# Patient Record
Sex: FEMALE | Race: OTHER | HISPANIC OR LATINO
[De-identification: names, ages, dates, MRNs, and addresses within clinical notes are randomized per-mention and may not be internally consistent; named-entity substitution may affect disease eponyms.]

---

## 2018-09-07 PROBLEM — Z00.00 ENCOUNTER FOR PREVENTIVE HEALTH EXAMINATION: Status: ACTIVE | Noted: 2018-09-07

## 2018-09-17 ENCOUNTER — APPOINTMENT (OUTPATIENT)
Dept: ORTHOPEDIC SURGERY | Facility: CLINIC | Age: 58
End: 2018-09-17
Payer: COMMERCIAL

## 2018-09-17 VITALS — HEIGHT: 60 IN | BODY MASS INDEX: 25.91 KG/M2 | WEIGHT: 132 LBS

## 2018-09-17 DIAGNOSIS — M76.52 PATELLAR TENDINITIS, LEFT KNEE: ICD-10-CM

## 2018-09-17 DIAGNOSIS — M22.42 CHONDROMALACIA PATELLAE, LEFT KNEE: ICD-10-CM

## 2018-09-17 DIAGNOSIS — S83.282A OTHER TEAR OF LATERAL MENISCUS, CURRENT INJURY, LEFT KNEE, INITIAL ENCOUNTER: ICD-10-CM

## 2018-09-17 DIAGNOSIS — M25.562 PAIN IN LEFT KNEE: ICD-10-CM

## 2018-09-17 PROCEDURE — 73564 X-RAY EXAM KNEE 4 OR MORE: CPT | Mod: LT

## 2018-09-17 PROCEDURE — 73560 X-RAY EXAM OF KNEE 1 OR 2: CPT | Mod: RT

## 2018-09-17 PROCEDURE — 99203 OFFICE O/P NEW LOW 30 MIN: CPT

## 2020-04-26 ENCOUNTER — MESSAGE (OUTPATIENT)
Age: 60
End: 2020-04-26

## 2020-05-08 LAB
SARS-COV-2 IGG SERPL IA-ACNC: <0.1 INDEX
SARS-COV-2 IGG SERPL QL IA: NEGATIVE

## 2022-04-28 ENCOUNTER — EMERGENCY (EMERGENCY)
Facility: HOSPITAL | Age: 62
LOS: 1 days | Discharge: HOME | End: 2022-04-28
Admitting: STUDENT IN AN ORGANIZED HEALTH CARE EDUCATION/TRAINING PROGRAM
Payer: COMMERCIAL

## 2022-04-28 VITALS
TEMPERATURE: 99 F | OXYGEN SATURATION: 98 % | DIASTOLIC BLOOD PRESSURE: 70 MMHG | WEIGHT: 134.92 LBS | HEART RATE: 62 BPM | RESPIRATION RATE: 18 BRPM | SYSTOLIC BLOOD PRESSURE: 130 MMHG
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
## 2022-04-28 DIAGNOSIS — S61.411A LACERATION WITHOUT FOREIGN BODY OF RIGHT HAND, INITIAL ENCOUNTER: ICD-10-CM
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
## 2022-04-28 DIAGNOSIS — Z23 ENCOUNTER FOR IMMUNIZATION: ICD-10-CM
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
## 2022-04-28 PROCEDURE — 12001 RPR S/N/AX/GEN/TRNK 2.5CM/<: CPT

## 2022-04-28 PROCEDURE — 90471 IMMUNIZATION ADMIN: CPT

## 2022-04-28 PROCEDURE — 90715 TDAP VACCINE 7 YRS/> IM: CPT

## 2022-04-28 PROCEDURE — 99283 EMERGENCY DEPT VISIT LOW MDM: CPT | Mod: 25

## 2022-04-28 RX ORDER — TETANUS TOXOID, REDUCED DIPHTHERIA TOXOID AND ACELLULAR PERTUSSIS VACCINE, ADSORBED 5; 2.5; 8; 8; 2.5 [IU]/.5ML; [IU]/.5ML; UG/.5ML; UG/.5ML; UG/.5ML
0.5 SUSPENSION INTRAMUSCULAR ONCE
Refills: 0 | Status: COMPLETED | OUTPATIENT
Start: 2022-04-28 | End: 2022-04-28

## 2022-04-28 RX ADMIN — TETANUS TOXOID, REDUCED DIPHTHERIA TOXOID AND ACELLULAR PERTUSSIS VACCINE, ADSORBED 0.5 MILLILITER(S): 5; 2.5; 8; 8; 2.5 SUSPENSION INTRAMUSCULAR at 11:47

## 2022-04-28 NOTE — ED PROVIDER NOTE - SKIN, MLM
2 cm lac to palm of right hand on ulnar aspect, +FROM of 5th digit, strength 5/5, sensation intact distally

## 2022-04-28 NOTE — ED PROVIDER NOTE - PATIENT PORTAL LINK FT
You can access the FollowMyHealth Patient Portal offered by [facility] by registering at the following website: [email or web address]. By joining [email or web address]’s FollowMyHealth portal, you will also be able to view your health information using other applications (apps) compatible with our system.

## 2022-04-28 NOTE — ED PROCEDURE NOTE - CPROC ED TOLERANCE1
Patient tolerated procedure well.
I will STOP taking the medications listed below when I get home from the hospital:  None

## 2022-04-28 NOTE — ED ADULT NURSE NOTE - OBJECTIVE STATEMENT
Pt AOX4. Pt presents with laceration to R palm. Pt states "I was washing dishes and I accidently cut my palm with a knife". No active bleeding. Denies blood thinner use. Denies numbness, tingling. Pt speaking in full complete sentences. Respirations even and unlabored. Pt speaking in full complete sentences. Respirations even and unlabored.

## 2022-04-28 NOTE — ED ADULT TRIAGE NOTE - CHIEF COMPLAINT QUOTE
pt with a sustained laceration to the right palm. stated " I was washing the dishes and cut myself with the knife" no active bleeding. denies use of blood thinners. unk last tetanus,

## 2022-04-28 NOTE — ED ADULT NURSE NOTE - NSIMPLEMENTINTERV_GEN_ALL_ED
Implemented All Universal Safety Interventions:  [place] to call system. Call bell, personal items and telephone within reach. Instruct patient to call for assistance. Room bathroom lighting operational. Non-slip footwear when patient is off stretcher. Physically safe environment: no spills, clutter or unnecessary equipment. Stretcher in lowest position, wheels locked, appropriate side rails in place.

## 2022-04-28 NOTE — ED PROVIDER NOTE - CLINICAL SUMMARY MEDICAL DECISION MAKING FREE TEXT BOX
60 y/o f present with lac to palm of right hand; tetanus updated in ED, ext NVI, lac repaired.  Will d/c with wound care instructions, f/u in 7-10 days for suture removal.

## 2022-04-28 NOTE — ED PROVIDER NOTE - OBJECTIVE STATEMENT
62 y/o f presents with lac to right palm.  Pt stating she was she was cleaning dishes and accidentally cut with a knife.  Pt unsure of last tetanus.  Denies numbness/tingling to ext, all other ROS negative.

## 2022-04-28 NOTE — ED PROVIDER NOTE - NSFOLLOWUPINSTRUCTIONS_ED_ALL_ED_FT
Please return to ED in 7-10 days for suture removal.      Laceration    WHAT YOU NEED TO KNOW:    A laceration is an injury to the skin and the soft tissue underneath it. Lacerations can happen anywhere on the body.    DISCHARGE INSTRUCTIONS:    Return to the emergency department if:   •You have heavy bleeding or bleeding that does not stop after 10 minutes of holding firm, direct pressure over the wound.      •Your wound opens up.      Call your doctor if:   •You have a fever or chills.      •Your laceration is red, warm, or swollen.      •You have red streaks on your skin coming from your wound.      •You have white or yellow drainage from the wound that smells bad.      •You have pain that gets worse, even after treatment.      •You have questions or concerns about your condition or care.      Medicines: You may need any of the following:   •Prescription pain medicine may be given. Ask your healthcare provider how to take this medicine safely. Some prescription pain medicines contain acetaminophen. Do not take other medicines that contain acetaminophen without talking to your healthcare provider. Too much acetaminophen may cause liver damage. Prescription pain medicine may cause constipation. Ask your healthcare provider how to prevent or treat constipation.       •Antibiotics help treat or prevent a bacterial infection.      •Take your medicine as directed. Contact your healthcare provider if you think your medicine is not helping or if you have side effects. Tell him or her if you are allergic to any medicine. Keep a list of the medicines, vitamins, and herbs you take. Include the amounts, and when and why you take them. Bring the list or the pill bottles to follow-up visits. Carry your medicine list with you in case of an emergency.      Care for your wound as directed:   •Do not get your wound wet until your healthcare provider says it is okay. Do not soak your wound in water. Do not go swimming until your healthcare provider says it is okay. Carefully wash the wound with soap and water. Gently pat the area dry or allow it to air dry.      •Change your bandages when they get wet, dirty, or after washing. Apply new, clean bandages as directed. Do not apply elastic bandages or tape too tight. Do not put powders or lotions over your incision.      •Apply antibiotic ointment as directed. Your healthcare provider may give you antibiotic ointment to put over your wound if you have stitches. If you have strips of tape over your incision, let them dry up and fall off on their own. If they do not fall off within 14 days, gently remove them. If you have glue over your wound, do not remove or pick at it. If your glue comes off, do not replace it with glue that you have at home.      •Check your wound every day for signs of infection, such as swelling, redness, or pus.      Self-care:   •Apply ice on your wound for 15 to 20 minutes every hour or as directed. Use an ice pack, or put crushed ice in a plastic bag. Cover it with a towel. Ice helps prevent tissue damage and decreases swelling and pain.      •Use a splint as directed. A splint will decrease movement and stress on your wound. It may help it heal faster. A splint may be used for lacerations over joints or areas of your body that bend. Ask your healthcare provider how to apply and remove a splint.      •Decrease scarring of your wound by applying ointments as directed. Do not apply ointments until your healthcare provider says it is okay. You may need to wait until your wound is healed. Ask which ointment to buy and how often to use it. After your wound is healed, use sunscreen over the area when you are out in the sun. You should do this for at least 6 months to 1 year after your injury.      Follow up with your doctor as directed: You may need to follow up in 24 to 48 hours to have your wound checked for infection. You will need to return in 3 to 14 days if you have stitches or staples so they can be removed. Care for your wound as directed to prevent infection and help it heal. Write down your questions so you remember to ask them during your visits.

## 2023-03-20 ENCOUNTER — OFFICE (OUTPATIENT)
Dept: URBAN - METROPOLITAN AREA CLINIC 28 | Facility: CLINIC | Age: 63
Setting detail: OPHTHALMOLOGY
End: 2023-03-20
Payer: COMMERCIAL

## 2023-03-20 ENCOUNTER — RX ONLY (RX ONLY)
Age: 63
End: 2023-03-20

## 2023-03-20 DIAGNOSIS — H25.13: ICD-10-CM

## 2023-03-20 DIAGNOSIS — H02.89: ICD-10-CM

## 2023-03-20 DIAGNOSIS — H10.45: ICD-10-CM

## 2023-03-20 DIAGNOSIS — H52.4: ICD-10-CM

## 2023-03-20 PROCEDURE — 92015 DETERMINE REFRACTIVE STATE: CPT | Performed by: OPHTHALMOLOGY

## 2023-03-20 PROCEDURE — 92004 COMPRE OPH EXAM NEW PT 1/>: CPT | Performed by: OPHTHALMOLOGY

## 2023-03-20 ASSESSMENT — DRY EYES - PHYSICIAN NOTES
OD_GENERALCOMMENTS: POOR TEAR FILM OU
OS_GENERALCOMMENTS: POOR TEAR FILM OU

## 2023-03-20 ASSESSMENT — TONOMETRY
OS_IOP_MMHG: 12
OD_IOP_MMHG: 11

## 2023-03-20 ASSESSMENT — CONFRONTATIONAL VISUAL FIELD TEST (CVF)
OD_FINDINGS: FULL
OS_FINDINGS: FULL

## 2023-03-20 ASSESSMENT — LID EXAM ASSESSMENTS
OS_MEIBOMITIS: 2+
OD_MEIBOMITIS: 2+

## 2023-03-21 PROBLEM — H10.45 ALLERGIC CONJUNCTIVITIS: Status: ACTIVE | Noted: 2023-03-20

## 2023-03-21 ASSESSMENT — REFRACTION_MANIFEST
OD_SPHERE: +2.50
OS_ADD: +2.50
OS_CYLINDER: -0.50
OS_AXIS: 090
OS_AXIS: 090
OS_SPHERE: +2.50
OD_AXIS: 085
OS_VA2: 20/20(J1+)
OS_VA1: 20/20
OD_CYLINDER: -0.50
OD_VA2: 20/20(J1+)
OD_CYLINDER: -0.50
OS_SPHERE: +1.00
OD_SPHERE: +1.00
OS_CYLINDER: -0.50
OD_ADD: +2.50
OD_VA1: 20/20
OD_AXIS: 085

## 2023-03-21 ASSESSMENT — REFRACTION_CURRENTRX
OD_CYLINDER: 0.00
OS_CYLINDER: 0.00
OS_AXIS: 180
OD_OVR_VA: 20/
OD_SPHERE: +2.00
OS_OVR_VA: 20/
OD_AXIS: 180
OS_SPHERE: +2.25

## 2023-03-21 ASSESSMENT — SPHEQUIV_DERIVED
OD_SPHEQUIV: 0.75
OS_SPHEQUIV: 0.75
OS_SPHEQUIV: 0.75
OS_SPHEQUIV: 2.25
OD_SPHEQUIV: 0.75
OD_SPHEQUIV: 2.25

## 2023-03-21 ASSESSMENT — REFRACTION_AUTOREFRACTION
OD_CYLINDER: -0.50
OS_CYLINDER: -0.50
OD_SPHERE: +1.00
OS_AXIS: 093
OD_AXIS: 083
OS_SPHERE: +1.00

## 2023-03-21 ASSESSMENT — AXIALLENGTH_DERIVED
OS_AL: 23.2261
OD_AL: 23.76
OS_AL: 23.8067
OD_AL: 23.1817
OS_AL: 23.8067
OD_AL: 23.76

## 2023-03-21 ASSESSMENT — KERATOMETRY
OD_K2POWER_DIOPTERS: 42.50
OS_K2POWER_DIOPTERS: 42.25
OS_K1POWER_DIOPTERS: 42.00
OS_AXISANGLE_DEGREES: 117
OD_AXISANGLE_DEGREES: 118
OD_K1POWER_DIOPTERS: 42.00

## 2023-03-21 ASSESSMENT — VISUAL ACUITY
OS_BCVA: 20/30+2
OD_BCVA: 20/25-2

## 2024-04-01 ENCOUNTER — OFFICE (OUTPATIENT)
Dept: URBAN - METROPOLITAN AREA CLINIC 28 | Facility: CLINIC | Age: 64
Setting detail: OPHTHALMOLOGY
End: 2024-04-01
Payer: COMMERCIAL

## 2024-04-01 DIAGNOSIS — H10.45: ICD-10-CM

## 2024-04-01 DIAGNOSIS — H02.89: ICD-10-CM

## 2024-04-01 DIAGNOSIS — H25.13: ICD-10-CM

## 2024-04-01 PROCEDURE — 92014 COMPRE OPH EXAM EST PT 1/>: CPT | Performed by: OPHTHALMOLOGY

## 2024-04-01 ASSESSMENT — LID EXAM ASSESSMENTS
OS_MEIBOMITIS: 2+
OD_MEIBOMITIS: 2+

## 2024-07-22 ENCOUNTER — NON-APPOINTMENT (OUTPATIENT)
Age: 64
End: 2024-07-22

## 2025-04-07 ENCOUNTER — OFFICE (OUTPATIENT)
Dept: URBAN - METROPOLITAN AREA CLINIC 28 | Facility: CLINIC | Age: 65
Setting detail: OPHTHALMOLOGY
End: 2025-04-07
Payer: COMMERCIAL

## 2025-04-07 DIAGNOSIS — H10.45: ICD-10-CM

## 2025-04-07 DIAGNOSIS — H16.223: ICD-10-CM

## 2025-04-07 DIAGNOSIS — H02.89: ICD-10-CM

## 2025-04-07 DIAGNOSIS — H25.13: ICD-10-CM

## 2025-04-07 PROCEDURE — 92014 COMPRE OPH EXAM EST PT 1/>: CPT | Performed by: OPHTHALMOLOGY

## 2025-04-07 ASSESSMENT — REFRACTION_AUTOREFRACTION
OD_AXIS: 075
OS_SPHERE: +1.00
OS_AXIS: 071
OD_SPHERE: +1.50
OD_CYLINDER: -0.75
OS_CYLINDER: -0.25

## 2025-04-07 ASSESSMENT — REFRACTION_MANIFEST
OD_AXIS: 085
OS_AXIS: 090
OD_SPHERE: +2.50
OS_SPHERE: +2.50
OS_VA2: 20/20(J1+)
OS_CYLINDER: -0.50
OS_SPHERE: +1.00
OD_VA2: 20/20(J1+)
OS_CYLINDER: -0.50
OD_CYLINDER: -0.50
OD_VA1: 20/20
OS_VA1: 20/20
OD_SPHERE: +1.00
OS_ADD: +2.50
OD_AXIS: 085
OD_ADD: +2.50
OS_AXIS: 090
OD_CYLINDER: -0.50

## 2025-04-07 ASSESSMENT — REFRACTION_CURRENTRX
OD_OVR_VA: 20/
OD_AXIS: 084
OS_AXIS: 086
OD_AXIS: 180
OS_SPHERE: +2.25
OD_CYLINDER: -0.50
OD_SPHERE: +0.75
OD_OVR_VA: 20/
OS_CYLINDER: 0.00
OS_AXIS: 180
OS_ADD: +1.50
OS_OVR_VA: 20/
OD_CYLINDER: 0.00
OS_OVR_VA: 20/
OS_SPHERE: +1.00
OS_CYLINDER: -0.50
OD_ADD: +1.50
OD_SPHERE: +2.00

## 2025-04-07 ASSESSMENT — DRY EYES - PHYSICIAN NOTES
OS_GENERALCOMMENTS: POOR TEAR FILM OU
OD_GENERALCOMMENTS: POOR TEAR FILM OU

## 2025-04-07 ASSESSMENT — TEAR BREAK UP TIME (TBUT)
OS_TBUT: 1+ 2+
OD_TBUT: 1+ 2+

## 2025-04-07 ASSESSMENT — KERATOMETRY
OD_AXISANGLE_DEGREES: 134
OS_K1POWER_DIOPTERS: 42.00
OS_AXISANGLE_DEGREES: 107
OD_K1POWER_DIOPTERS: 41.75
OD_K2POWER_DIOPTERS: 42.00
OS_K2POWER_DIOPTERS: 42.25

## 2025-04-07 ASSESSMENT — TONOMETRY
OS_IOP_MMHG: 16
OD_IOP_MMHG: 12

## 2025-04-07 ASSESSMENT — VISUAL ACUITY
OS_BCVA: 20/20
OD_BCVA: 20/20

## 2025-04-07 ASSESSMENT — CONFRONTATIONAL VISUAL FIELD TEST (CVF)
OD_FINDINGS: FULL
OS_FINDINGS: FULL

## 2025-04-07 ASSESSMENT — LID EXAM ASSESSMENTS
OD_MEIBOMITIS: 2+
OS_MEIBOMITIS: 2+